# Patient Record
Sex: MALE | URBAN - METROPOLITAN AREA
[De-identification: names, ages, dates, MRNs, and addresses within clinical notes are randomized per-mention and may not be internally consistent; named-entity substitution may affect disease eponyms.]

---

## 2021-05-19 ENCOUNTER — OFFICE VISIT (OUTPATIENT)
Dept: URBAN - METROPOLITAN AREA CLINIC 60 | Facility: CLINIC | Age: 76
End: 2021-05-19
Payer: MEDICAID

## 2021-05-19 DIAGNOSIS — H40.023 OPEN ANGLE WITH BORDERLINE FINDINGS, HIGH RISK, BILATERAL: ICD-10-CM

## 2021-05-19 DIAGNOSIS — H52.4 PRESBYOPIA: ICD-10-CM

## 2021-05-19 DIAGNOSIS — Z79.84 LONG TERM (CURRENT) USE OF ORAL HYPOGLYCEMIC DRUGS: ICD-10-CM

## 2021-05-19 DIAGNOSIS — H25.13 AGE-RELATED NUCLEAR CATARACT, BILATERAL: ICD-10-CM

## 2021-05-19 DIAGNOSIS — E11.9 TYPE 2 DIABETES MELLITUS W/O COMPLICATION: Primary | ICD-10-CM

## 2021-05-19 PROCEDURE — 92004 COMPRE OPH EXAM NEW PT 1/>: CPT | Performed by: OPTOMETRIST

## 2021-05-19 PROCEDURE — 92250 FUNDUS PHOTOGRAPHY W/I&R: CPT | Performed by: OPTOMETRIST

## 2021-05-19 PROCEDURE — 76514 ECHO EXAM OF EYE THICKNESS: CPT | Performed by: OPTOMETRIST

## 2021-05-19 ASSESSMENT — INTRAOCULAR PRESSURE
OD: 10
OS: 10

## 2021-05-19 NOTE — IMPRESSION/PLAN
Chronic.     Impression: Age-related nuclear cataract, bilateral: H25.13. Plan: Discussed diagnosis of cataracts with patient. Patient has no significant visual complaints at this time and is able to perform all their daily activities. We will re-evaluate cataract on return visit unless patient notes any changes sooner.

## 2021-05-19 NOTE — IMPRESSION/PLAN
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 *family Hx - Uncle *average pachs *superior notch OD. Significant cupping OD>OS Plan: Low IOPs today. Recommend morning appointment/IOP check next visit. Baseline photos and pachs performed today. Discussed condition in detail with patient. Patient to RTC in 2-3 months for baseline HVF 24-2, OCT (RNFL) and IOP check. 

Photos:  Cupping OU

## 2022-10-12 ENCOUNTER — OFFICE VISIT (OUTPATIENT)
Dept: URBAN - METROPOLITAN AREA CLINIC 60 | Facility: CLINIC | Age: 77
End: 2022-10-12
Payer: COMMERCIAL

## 2022-10-12 DIAGNOSIS — H40.023 OPEN ANGLE WITH BORDERLINE FINDINGS, HIGH RISK, BILATERAL: ICD-10-CM

## 2022-10-12 DIAGNOSIS — H25.13 AGE-RELATED NUCLEAR CATARACT, BILATERAL: ICD-10-CM

## 2022-10-12 DIAGNOSIS — E11.3292 DIABETES MELLITUS TYPE 2 WITH MILD NON-PROLIFERATIVE RETINOPATHY WITHOUT MACULAR EDEMA, LEFT EYE: Primary | ICD-10-CM

## 2022-10-12 DIAGNOSIS — Z79.84 LONG TERM (CURRENT) USE OF ORAL HYPOGLYCEMIC DRUGS: ICD-10-CM

## 2022-10-12 DIAGNOSIS — Z79.4 LONG TERM (CURRENT) USE OF INSULIN: ICD-10-CM

## 2022-10-12 PROCEDURE — 92250 FUNDUS PHOTOGRAPHY W/I&R: CPT | Performed by: OPTOMETRIST

## 2022-10-12 PROCEDURE — 92014 COMPRE OPH EXAM EST PT 1/>: CPT | Performed by: OPTOMETRIST

## 2022-10-12 ASSESSMENT — INTRAOCULAR PRESSURE
OS: 11
OD: 12

## 2022-10-12 NOTE — IMPRESSION/PLAN
Impression: Diabetes mellitus Type 2 with mild non-proliferative retinopathy without macular edema, left eye: E11.3292. Plan: Photos done today. No visible retinopathy OD. Discussed today's findings with patient. Discussed the importance of good blood sugar control in length with patient along with continued follow up care with PCP for the best long term outcome. Photos OD: no visible retinopathy OS: mild NPDR

## 2022-10-12 NOTE — IMPRESSION/PLAN
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 *family Hx - Uncle *average pachs *superior notch OD. Significant cupping OD>OS Plan: IOP stable/low OU. Disc photos done today. Discussed condition and importance of baseline tests in detail with patient. Patient to RTC in 6 months for VF 24/2, IOP check and RNFL OCT. Dilate patient next visit.  

Photos: cupping OU

## 2023-04-12 ENCOUNTER — OFFICE VISIT (OUTPATIENT)
Dept: URBAN - METROPOLITAN AREA CLINIC 60 | Facility: CLINIC | Age: 78
End: 2023-04-12
Payer: COMMERCIAL

## 2023-04-12 DIAGNOSIS — H40.1222 LOW-TENSION GLAUCOMA, LEFT EYE, MODERATE STAGE: ICD-10-CM

## 2023-04-12 DIAGNOSIS — H40.1213 LOW-TENSION GLAUCOMA, RIGHT EYE, SEVERE STAGE: Primary | ICD-10-CM

## 2023-04-12 PROCEDURE — 99213 OFFICE O/P EST LOW 20 MIN: CPT | Performed by: OPTOMETRIST

## 2023-04-12 PROCEDURE — 92133 CPTRZD OPH DX IMG PST SGM ON: CPT | Performed by: OPTOMETRIST

## 2023-04-12 PROCEDURE — 92083 EXTENDED VISUAL FIELD XM: CPT | Performed by: OPTOMETRIST

## 2023-04-12 RX ORDER — LATANOPROST 50 UG/ML
0.005 % SOLUTION OPHTHALMIC
Qty: 7.5 | Refills: 3 | Status: ACTIVE
Start: 2023-04-12

## 2023-04-12 ASSESSMENT — INTRAOCULAR PRESSURE
OD: 16
OS: 16

## 2023-04-12 NOTE — IMPRESSION/PLAN
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 *start Latanoprost QHS OU 04/12/23 Plan: IOP stable today. VF 24-2 and OCT RNFL done today. Patient educated on findings. Discussed severity of glaucomatous damage especially OD and risks of progression. Patient to start Latanoprost QHS OU (ERXed to pharmacy on file) Patient educated on drop side effects and how to instill drops. Patient to RTC in 1 month for IOP check. OCT RNFL
OD: ave t 61 superior and inferior thinning/borderline temporal/significantly thinner than OS/large disc OS: ave t 77 borderline temporal/large disc

VF 24-2 OD: severe 360 degree constriction OS: nasal step

## 2023-05-10 ENCOUNTER — OFFICE VISIT (OUTPATIENT)
Dept: URBAN - METROPOLITAN AREA CLINIC 60 | Facility: CLINIC | Age: 78
End: 2023-05-10
Payer: COMMERCIAL

## 2023-05-10 DIAGNOSIS — H40.1222 LOW-TENSION GLAUCOMA, LEFT EYE, MODERATE STAGE: ICD-10-CM

## 2023-05-10 DIAGNOSIS — H40.1213 LOW-TENSION GLAUCOMA, RIGHT EYE, SEVERE STAGE: Primary | ICD-10-CM

## 2023-05-10 PROCEDURE — 99213 OFFICE O/P EST LOW 20 MIN: CPT | Performed by: OPTOMETRIST

## 2023-05-10 ASSESSMENT — INTRAOCULAR PRESSURE
OS: 13
OD: 13

## 2023-05-10 NOTE — IMPRESSION/PLAN
Impression: Low-tension glaucoma, right eye, severe stage: E81.6567. *start Latanoprost QHS OU 04/12/23 Plan: IOP lower today since starting Latanoprost OU. Patient educated on findings. Patient to continue Latanoprost QHS OU. Discussed in great detail with patient the importance of being consistent instilling drops every night. Patient agreed and understood the importance of using drops on a regular basis. Need to watch closely especially OD. Patient to RTC in 5 months for complete exam and photos.

## 2023-10-18 ENCOUNTER — OFFICE VISIT (OUTPATIENT)
Dept: URBAN - METROPOLITAN AREA CLINIC 60 | Facility: CLINIC | Age: 78
End: 2023-10-18
Payer: MEDICARE

## 2023-10-18 DIAGNOSIS — H40.1222 LOW-TENSION GLAUCOMA, LEFT EYE, MODERATE STAGE: ICD-10-CM

## 2023-10-18 DIAGNOSIS — H40.1213 LOW-TENSION GLAUCOMA, RIGHT EYE, SEVERE STAGE: ICD-10-CM

## 2023-10-18 DIAGNOSIS — Z79.84 LONG TERM (CURRENT) USE OF ORAL HYPOGLYCEMIC DRUGS: ICD-10-CM

## 2023-10-18 DIAGNOSIS — H25.13 AGE-RELATED NUCLEAR CATARACT, BILATERAL: ICD-10-CM

## 2023-10-18 DIAGNOSIS — E11.3293 TYPE 2 DIAB W MILD NONPRLF DIABETIC RTNOP W/O MACULAR EDEMA, BILATERAL: Primary | ICD-10-CM

## 2023-10-18 PROCEDURE — 92250 FUNDUS PHOTOGRAPHY W/I&R: CPT | Performed by: OPTOMETRIST

## 2023-10-18 PROCEDURE — 92014 COMPRE OPH EXAM EST PT 1/>: CPT | Performed by: OPTOMETRIST

## 2023-10-18 ASSESSMENT — INTRAOCULAR PRESSURE
OS: 15
OD: 14

## 2024-01-22 NOTE — IMPRESSION/PLAN
Impression: Type 2 diabetes mellitus w/o complication: W39.8. Plan: No diabetic retinopathy. Recommend yearly diabetic eye exam. Discussed with patient importance of good blood sugar control with regular visits with PCP. Photo documentation today. 

Photos: No visible retinopathy OU negative - no polyuria, no polydipsia

## 2024-04-15 ENCOUNTER — OFFICE VISIT (OUTPATIENT)
Dept: URBAN - METROPOLITAN AREA CLINIC 59 | Facility: CLINIC | Age: 79
End: 2024-04-15
Payer: COMMERCIAL

## 2024-04-15 DIAGNOSIS — Z79.4 LONG TERM (CURRENT) USE OF INSULIN: ICD-10-CM

## 2024-04-15 DIAGNOSIS — Z79.84 LONG TERM (CURRENT) USE OF ORAL HYPOGLYCEMIC DRUGS: ICD-10-CM

## 2024-04-15 DIAGNOSIS — E11.3293 TYPE 2 DIAB W MILD NONPRLF DIABETIC RTNOP W/O MACULAR EDEMA, BILATERAL: Primary | ICD-10-CM

## 2024-04-15 DIAGNOSIS — H40.1213 LOW-TENSION GLAUCOMA, RIGHT EYE, SEVERE STAGE: ICD-10-CM

## 2024-04-15 DIAGNOSIS — H40.1222 LOW-TENSION GLAUCOMA, LEFT EYE, MODERATE STAGE: ICD-10-CM

## 2024-04-15 DIAGNOSIS — H25.13 AGE-RELATED NUCLEAR CATARACT, BILATERAL: ICD-10-CM

## 2024-04-15 PROCEDURE — 92133 CPTRZD OPH DX IMG PST SGM ON: CPT | Performed by: OPTOMETRIST

## 2024-04-15 PROCEDURE — 92014 COMPRE OPH EXAM EST PT 1/>: CPT | Performed by: OPTOMETRIST

## 2024-04-15 PROCEDURE — 92083 EXTENDED VISUAL FIELD XM: CPT | Performed by: OPTOMETRIST

## 2024-04-15 ASSESSMENT — INTRAOCULAR PRESSURE
OD: 15
OS: 14

## 2024-10-17 ENCOUNTER — OFFICE VISIT (OUTPATIENT)
Dept: URBAN - METROPOLITAN AREA CLINIC 59 | Facility: CLINIC | Age: 79
End: 2024-10-17
Payer: COMMERCIAL

## 2024-10-17 DIAGNOSIS — Z79.84 LONG TERM (CURRENT) USE OF ORAL HYPOGLYCEMIC DRUGS: ICD-10-CM

## 2024-10-17 DIAGNOSIS — H40.1213 LOW-TENSION GLAUCOMA, RIGHT EYE, SEVERE STAGE: ICD-10-CM

## 2024-10-17 DIAGNOSIS — H25.13 AGE-RELATED NUCLEAR CATARACT, BILATERAL: ICD-10-CM

## 2024-10-17 DIAGNOSIS — H40.1222 LOW-TENSION GLAUCOMA, LEFT EYE, MODERATE STAGE: ICD-10-CM

## 2024-10-17 DIAGNOSIS — E11.3293 TYPE 2 DIAB W MILD NONPRLF DIABETIC RTNOP W/O MACULAR EDEMA, BILATERAL: Primary | ICD-10-CM

## 2024-10-17 PROCEDURE — 92014 COMPRE OPH EXAM EST PT 1/>: CPT | Performed by: OPTOMETRIST

## 2024-10-17 PROCEDURE — 92250 FUNDUS PHOTOGRAPHY W/I&R: CPT | Performed by: OPTOMETRIST

## 2024-10-17 ASSESSMENT — VISUAL ACUITY
OS: 20/40
OD: 20/40

## 2024-10-17 ASSESSMENT — INTRAOCULAR PRESSURE
OS: 14
OD: 14

## 2025-04-17 ENCOUNTER — OFFICE VISIT (OUTPATIENT)
Dept: URBAN - METROPOLITAN AREA CLINIC 59 | Facility: CLINIC | Age: 80
End: 2025-04-17
Payer: COMMERCIAL

## 2025-04-17 DIAGNOSIS — H40.1213 LOW-TENSION GLAUCOMA, RIGHT EYE, SEVERE STAGE: Primary | ICD-10-CM

## 2025-04-17 DIAGNOSIS — H16.223 KERATOCONJUNCTIVITIS SICCA, BILATERAL: ICD-10-CM

## 2025-04-17 PROCEDURE — 99213 OFFICE O/P EST LOW 20 MIN: CPT | Performed by: OPTOMETRIST

## 2025-04-17 PROCEDURE — 92133 CPTRZD OPH DX IMG PST SGM ON: CPT | Performed by: OPTOMETRIST

## 2025-04-17 PROCEDURE — 92083 EXTENDED VISUAL FIELD XM: CPT | Performed by: OPTOMETRIST

## 2025-04-17 RX ORDER — BENAZEPRIL HYDROCHLORIDE 20 MG/1
20 MG TABLET ORAL
Qty: 0 | Refills: 0 | Status: ACTIVE
Start: 2025-04-17

## 2025-04-17 RX ORDER — AMLODIPINE BESYLATE 10 MG/1
10 MG TABLET ORAL
Qty: 0 | Refills: 0 | Status: ACTIVE
Start: 2025-04-17

## 2025-04-17 RX ORDER — ATORVASTATIN CALCIUM 20 MG/1
20 MG TABLET, FILM COATED ORAL
Qty: 0 | Refills: 0 | Status: ACTIVE
Start: 2025-04-17

## 2025-04-17 RX ORDER — SEMAGLUTIDE 1.34 MG/ML
INJECTION, SOLUTION SUBCUTANEOUS
Qty: 0 | Refills: 0 | Status: ACTIVE
Start: 2025-04-17

## 2025-04-17 ASSESSMENT — INTRAOCULAR PRESSURE
OS: 11
OD: 11